# Patient Record
Sex: FEMALE | Employment: FULL TIME | URBAN - METROPOLITAN AREA
[De-identification: names, ages, dates, MRNs, and addresses within clinical notes are randomized per-mention and may not be internally consistent; named-entity substitution may affect disease eponyms.]

---

## 2018-12-14 LAB — CRC RECOMMENDATION EXT: NORMAL

## 2021-10-08 ENCOUNTER — TELEPHONE (OUTPATIENT)
Dept: ADMINISTRATIVE | Facility: HOSPITAL | Age: 56
End: 2021-10-08

## 2021-10-08 ENCOUNTER — OFFICE VISIT (OUTPATIENT)
Dept: PRIMARY CARE CLINIC | Facility: CLINIC | Age: 56
End: 2021-10-08
Payer: OTHER GOVERNMENT

## 2021-10-08 VITALS
WEIGHT: 156.5 LBS | DIASTOLIC BLOOD PRESSURE: 86 MMHG | OXYGEN SATURATION: 99 % | BODY MASS INDEX: 24.56 KG/M2 | HEIGHT: 67 IN | SYSTOLIC BLOOD PRESSURE: 126 MMHG | HEART RATE: 69 BPM

## 2021-10-08 DIAGNOSIS — Z11.59 SCREENING FOR VIRAL DISEASE: ICD-10-CM

## 2021-10-08 DIAGNOSIS — Z00.00 ANNUAL PHYSICAL EXAM: Primary | ICD-10-CM

## 2021-10-08 DIAGNOSIS — E04.1 THYROID NODULE: ICD-10-CM

## 2021-10-08 DIAGNOSIS — Z11.4 SCREENING FOR HIV (HUMAN IMMUNODEFICIENCY VIRUS): ICD-10-CM

## 2021-10-08 PROCEDURE — 99999 PR PBB SHADOW E&M-NEW PATIENT-LVL III: ICD-10-PCS | Mod: PBBFAC,,, | Performed by: INTERNAL MEDICINE

## 2021-10-08 PROCEDURE — 99396 PR PREVENTIVE VISIT,EST,40-64: ICD-10-PCS | Mod: S$GLB,,, | Performed by: INTERNAL MEDICINE

## 2021-10-08 PROCEDURE — 99999 PR PBB SHADOW E&M-NEW PATIENT-LVL III: CPT | Mod: PBBFAC,,, | Performed by: INTERNAL MEDICINE

## 2021-10-08 PROCEDURE — 99396 PREV VISIT EST AGE 40-64: CPT | Mod: S$GLB,,, | Performed by: INTERNAL MEDICINE

## 2021-10-13 ENCOUNTER — TELEPHONE (OUTPATIENT)
Dept: ADMINISTRATIVE | Facility: HOSPITAL | Age: 56
End: 2021-10-13

## 2021-10-19 LAB
ALBUMIN: 4.4 GRAM/DL (ref 3.5–5)
ALP SERPL-CCNC: 87 UNIT/L (ref 38–126)
ALT SERPL W P-5'-P-CCNC: 11 UNIT/L (ref 7–56)
ANION GAP SERPL CALC-SCNC: 18 MEQ/L (ref 9–18)
AST SERPL-CCNC: 16 UNIT/L (ref 7–40)
BASOPHILS ABSOLUTE COUNT: 0 K/UL (ref 0–0.2)
BASOPHILS NFR BLD: 0.5 % (ref 0–2)
BILIRUB SERPL-MCNC: 0.6 MG/DL (ref 0–1.2)
BUN BLD-MCNC: 12 MG/DL (ref 7–21)
BUN/CREAT SERPL: 11 RATIO (ref 6–22)
CALC OSMOLALITY: 277 MOSM/KG (ref 275–295)
CALCIUM SERPL-MCNC: 9.6 MG/DL (ref 8.5–10.4)
CHLORIDE SERPL-SCNC: 102 MEQ/L (ref 98–107)
CHOL/HDLC RATIO: 5
CHOLEST SERPL-MSCNC: 226 MG/DL (ref 100–200)
CO2 SERPL-SCNC: 23 MEQ/L (ref 21–31)
CREAT SERPL-MCNC: 1.1 MG/DL (ref 0.5–1)
DIFFERENTIAL TYPE: ABNORMAL
EOSINOPHIL NFR BLD: 1 % (ref 0–4)
EOSINOPHILS ABSOLUTE COUNT: 0.1 K/UL (ref 0–0.7)
ERYTHROCYTE [DISTWIDTH] IN BLOOD BY AUTOMATED COUNT: 12.5 % (ref 12–15.3)
FREE T4: 1.2 NANOGRAM/DL (ref 0.9–1.7)
GFR: 53.2 ML/MIN/1.73M2
GLUCOSE SERPL-MCNC: 96 MG/DL (ref 70–100)
HCT VFR BLD AUTO: 37.9 % (ref 37–47)
HDLC SERPL-MCNC: 50 MG/DL (ref 40–75)
HGB BLD-MCNC: 13.2 GRAM/DL (ref 12–16)
LDLC SERPL CALC-MCNC: 164 MG/DL (ref 0–125)
LYMPHOCYTES %: 28.9 % (ref 15–45)
LYMPHOCYTES ABSOLUTE COUNT: 1.9 K/UL (ref 1–4.2)
MCH RBC QN AUTO: 32.7 PICOGRAM (ref 27–33)
MCHC RBC AUTO-ENTMCNC: 34.9 GRAM/DL (ref 32–36)
MCV RBC AUTO: 93.7 FEMTOLITER (ref 81–99)
MONOCYTES %: 8.6 % (ref 3–13)
MONOCYTES ABSOLUTE COUNT: 0.6 K/UL (ref 0.1–0.8)
NEUTROPHILS ABSOLUTE COUNT: 4 K/UL (ref 2.1–7.6)
NEUTROPHILS RELATIVE PERCENT: 61 % (ref 32–80)
NONHDLC SERPL-MCNC: 176 MG/DL (ref 60–125)
PLATELET # BLD AUTO: 190 K/UL (ref 150–350)
PMV BLD AUTO: 9.7 FEMTOLITER (ref 7–10.2)
POTASSIUM SERPL-SCNC: 4.2 MEQ/L (ref 3.5–5)
RBC # BLD AUTO: 4.05 MIL/UL (ref 4.2–5.4)
SODIUM BLD-SCNC: 139 MEQ/L (ref 135–145)
TOTAL PROTEIN: 6.4 GRAM/DL (ref 6.3–8.2)
TRIGL SERPL-MCNC: 78 MG/DL (ref 30–150)
TSH SERPL DL<=0.005 MIU/L-ACNC: 2.16 UIL/ML (ref 0.35–4)
WBC # BLD AUTO: 6.6 K/UL (ref 4.5–11)

## 2022-01-05 DIAGNOSIS — Z12.31 OTHER SCREENING MAMMOGRAM: ICD-10-CM

## 2022-02-09 DIAGNOSIS — Z12.11 COLON CANCER SCREENING: ICD-10-CM

## 2022-04-25 ENCOUNTER — TELEPHONE (OUTPATIENT)
Dept: INTERNAL MEDICINE | Facility: CLINIC | Age: 57
End: 2022-04-25
Payer: OTHER GOVERNMENT

## 2022-04-25 NOTE — TELEPHONE ENCOUNTER
----- Message from Tina Iyer sent at 4/25/2022 10:27 AM CDT -----  Patient called back stating she took care and was able to reach her old ENT and will see him this week.    Thank you

## 2022-04-25 NOTE — TELEPHONE ENCOUNTER
----- Message from Jyothi Gordillo sent at 4/25/2022  9:24 AM CDT -----  Type:  Needs Medical Advice    Who Called: pt  Symptoms (please be specific): clogged ears  How long has patient had these symptoms:  1 week  Pharmacy name and phone #:    Would the patient rather a call back or a response via [facility]? call  Best Call Back Number: [phone]  Additional Information:

## 2022-04-25 NOTE — TELEPHONE ENCOUNTER
----- Message from Rosemary Acosta sent at 4/25/2022 10:05 AM CDT -----  Contact: [phone]  Pt returning missed call. Please Advise.

## 2022-09-22 ENCOUNTER — TELEPHONE (OUTPATIENT)
Dept: INTERNAL MEDICINE | Facility: CLINIC | Age: 57
End: 2022-09-22
Payer: OTHER GOVERNMENT

## 2022-09-22 DIAGNOSIS — Z00.00 ANNUAL PHYSICAL EXAM: Primary | ICD-10-CM

## 2022-09-22 NOTE — TELEPHONE ENCOUNTER
----- Message from Norma Adair sent at 9/22/2022  9:52 AM CDT -----  Contact: [phone]  type: Lab    Caller is requesting to schedule their Lab appointment prior to annual appointment.  Order is not listed in EPIC.  Please enter order and contact patient to schedule.    Name of Caller:Maritza Hathaway Date and Time of Labs 11/14/22    Date of Annual Physical Appointment:11/9/22    Where would they like the lab performed?[facility]     Would the patient rather a call back or a response via [facility]? call    Best Call Back Number:[phone]

## 2022-10-10 LAB
ALBUMIN: 4.3 G/DL (ref 3.5–5)
ALP SERPL-CCNC: 87 U/L (ref 38–126)
ALT SERPL W P-5'-P-CCNC: 9 U/L (ref 7–56)
ANION GAP SERPL CALC-SCNC: 17.5 MMOL/L (ref 9–18)
AST SERPL-CCNC: 15 U/L (ref 7–40)
BASOPHILS ABSOLUTE COUNT: 0 THOUSAND/UL (ref 0–0.2)
BASOPHILS NFR BLD: 0.6 % (ref 0–2)
BILIRUB SERPL-MCNC: 0.5 MG/DL (ref 0–1.2)
BUN BLD-MCNC: 14 MG/DL (ref 7–21)
BUN/CREAT SERPL: 13 (ref 6–22)
CALC OSMOLALITY: 284 MOSM/KG (ref 275–295)
CALCIUM SERPL-MCNC: 9.7 MG/DL (ref 8.5–10.4)
CHLORIDE SERPL-SCNC: 106 MMOL/L (ref 98–107)
CHOL/HDLC RATIO: 4.84
CHOLEST SERPL-MSCNC: 247 MG/DL (ref 100–200)
CO2 SERPL-SCNC: 23 MMOL/L (ref 21–31)
CREAT SERPL-MCNC: 1.04 MG/DL (ref 0.5–1)
EGFR: 68 ML/MIN
EOSINOPHIL NFR BLD: 1.2 % (ref 0–4)
EOSINOPHILS ABSOLUTE COUNT: 0.1 THOUSAND/UL (ref 0–0.7)
ERYTHROCYTE [DISTWIDTH] IN BLOOD BY AUTOMATED COUNT: 12.5 % (ref 12–15.3)
FREE T4: 1.06 NG/DL (ref 0.9–1.7)
GFR: 59 ML/MIN
GLUCOSE SERPL-MCNC: 97 MG/DL (ref 70–100)
HCT VFR BLD AUTO: 38.1 % (ref 37–47)
HDLC SERPL-MCNC: 51 MG/DL (ref 40–75)
HGB BLD-MCNC: 13.2 GM/DL (ref 12–16)
LDLC SERPL CALC-MCNC: 179 MG/DL (ref 0–125)
LYMPHOCYTES %: 34.3 % (ref 15–45)
LYMPHOCYTES ABSOLUTE COUNT: 2.2 THOUSAND/UL (ref 1–4.2)
MCH RBC QN AUTO: 32.4 PG (ref 27–33)
MCHC RBC AUTO-ENTMCNC: 34.6 G/DL (ref 32–36)
MCV RBC AUTO: 93.6 FL (ref 81–99)
MONOCYTES %: 8.8 % (ref 3–13)
MONOCYTES ABSOLUTE COUNT: 0.6 THOUSAND/UL (ref 0.1–0.8)
NEUTROPHILS ABSOLUTE COUNT: 3.4 THOUSAND/UL (ref 2.1–7.6)
NEUTROPHILS RELATIVE PERCENT: 55.1 % (ref 32–80)
PLATELET # BLD AUTO: 194 THOUSAND/UL (ref 150–350)
PMV BLD AUTO: 9.2 FL (ref 7–10.2)
POTASSIUM SERPL-SCNC: 4.5 MMOL/L (ref 3.5–5)
RBC # BLD AUTO: 4.07 MILLION/UL (ref 4.2–5.4)
SODIUM BLD-SCNC: 142 MMOL/L (ref 135–145)
TOTAL PROTEIN: 6 G/DL (ref 6.3–8.2)
TRIGL SERPL-MCNC: 106 MG/DL (ref 30–150)
TSH SERPL DL<=0.005 MIU/L-ACNC: 2.67 UIU/ML (ref 0.35–4)
WBC # BLD AUTO: 6.3 THOUSAND/UL (ref 4.5–11)

## 2022-11-09 ENCOUNTER — TELEPHONE (OUTPATIENT)
Dept: INTERNAL MEDICINE | Facility: CLINIC | Age: 57
End: 2022-11-09
Payer: OTHER GOVERNMENT

## 2022-11-09 ENCOUNTER — OFFICE VISIT (OUTPATIENT)
Dept: INTERNAL MEDICINE | Facility: CLINIC | Age: 57
End: 2022-11-09
Payer: OTHER GOVERNMENT

## 2022-11-09 VITALS
BODY MASS INDEX: 23.88 KG/M2 | SYSTOLIC BLOOD PRESSURE: 120 MMHG | OXYGEN SATURATION: 99 % | HEART RATE: 84 BPM | WEIGHT: 152.13 LBS | HEIGHT: 67 IN | DIASTOLIC BLOOD PRESSURE: 80 MMHG

## 2022-11-09 DIAGNOSIS — E04.1 THYROID NODULE: ICD-10-CM

## 2022-11-09 DIAGNOSIS — Z00.00 ANNUAL PHYSICAL EXAM: Primary | ICD-10-CM

## 2022-11-09 DIAGNOSIS — E78.2 HYPERLIPIDEMIA, MIXED: ICD-10-CM

## 2022-11-09 PROCEDURE — 99999 PR PBB SHADOW E&M-EST. PATIENT-LVL III: CPT | Mod: PBBFAC,,, | Performed by: INTERNAL MEDICINE

## 2022-11-09 PROCEDURE — 99999 PR PBB SHADOW E&M-EST. PATIENT-LVL III: ICD-10-PCS | Mod: PBBFAC,,, | Performed by: INTERNAL MEDICINE

## 2022-11-09 PROCEDURE — 99396 PR PREVENTIVE VISIT,EST,40-64: ICD-10-PCS | Mod: S$GLB,,, | Performed by: INTERNAL MEDICINE

## 2022-11-09 PROCEDURE — 99396 PREV VISIT EST AGE 40-64: CPT | Mod: S$GLB,,, | Performed by: INTERNAL MEDICINE

## 2022-11-09 NOTE — LETTER
AUTHORIZATION FOR RELEASE OF   CONFIDENTIAL INFORMATION    Dear Dr. Tadeo,    We are seeing Maritza Sesay, date of birth 1965, in the clinic at [facility]. Lizandro Rutledge MD is the patient's PCP. Maritza Sesay has an outstanding lab/procedure at the time we reviewed her chart. In order to help keep her health information updated, she has authorized us to request the following medical record(s):        (  )  MAMMOGRAM                                      (X)  COLONOSCOPY      (  )  PAP SMEAR                                          (  )  OUTSIDE LAB RESULTS     (  )  DEXA SCAN                                          (  )  EYE EXAM            (  )  FOOT EXAM                                          (  )  ENTIRE RECORD     (  )  OUTSIDE IMMUNIZATIONS                 (  )  _______________         Please fax records to [facility], David M Klibert, MD, [phone]     If you have any questions, please contact [facility] at [phone].           Patient Name: Maritza Sesay  : 1965  Patient Phone #: [phone]

## 2022-11-09 NOTE — PROGRESS NOTES
[facility] Note    Chief Complaint      Chief Complaint   Patient presents with    Annual Exam       History of Present Illness      Maritza Sesay is a 57 y.o. female with chronic conditions of thyroid nodule who presents today for: annual preventative visit.   Thyroid nodule: Sees Dr. Anna.  Surveillance imaging has been showing stability.  Diet: Prepares own food mostly.  Limiting fatty foods and carbs.  Trying to cut back on sweets.  Planning to cut back on sodas.  Drinks plenty water.  Exercise: mostly yardwork. Previously walking more.    Denies drinking and driving, drinking more than 4 drinks on occasion, drug use.     Flu shot declines.  Tdap unsure, due next visit.  COVID vaccine UTD.  Shingrix due.  Pneumonia vaccine due age 63.   Mammogram UTD. Pap smear UTD.  Sees Dr. Ardon. DEXA due age 63.  Colonoscopy Dr. Tadeo, UTD.     Past Medical History:  History reviewed. No pertinent past medical history.    Past Surgical History:   has a past surgical history that includes Cataract extraction, bilateral (Bilateral).    Family History:  family history includes Heart attack in her father; Hypertension in her mother; Pacemaker/defibrilator in her mother.     Social History:  Social History     Tobacco Use    Smoking status: Light Smoker    Smokeless tobacco: Never   Substance Use Topics    Alcohol use: Yes       I personally reviewed all past medical, surgical, social and family history.    Review of Systems   Constitutional:  Negative for chills, fever and malaise/fatigue.   Respiratory:  Negative for shortness of breath.    Cardiovascular:  Negative for chest pain.   Gastrointestinal:  Negative for constipation, diarrhea, nausea and vomiting.   Skin:  Negative for rash.   Neurological:  Negative for weakness.   All other systems reviewed and are negative.     Medications:  No outpatient encounter medications on file as of 11/9/2022.     No facility-administered encounter  "medications on file as of 11/9/2022.       Allergies:  Review of patient's allergies indicates:  No Known Allergies    Health Maintenance:  Immunization History   Administered Date(s) Administered    COVID-19, MRNA, LN-S, PF (Pfizer) (Purple Cap) 06/24/2021, 07/16/2021      Health Maintenance   Topic Date Due    Hepatitis C Screening  Never done    TETANUS VACCINE  Never done    Mammogram  01/17/2023    Lipid Panel  10/10/2027        Physical Exam      Vital Signs  Pulse: 84  SpO2: 99 %  BP: 120/80  BP Location: Left arm  Patient Position: Sitting  Pain Score: 0-No pain  Height and Weight  Height: 5' 7" (170.2 cm)  Weight: 69 kg (152 lb 1.9 oz)  BSA (Calculated - sq m): 1.81 sq meters  BMI (Calculated): 23.8  Weight in (lb) to have BMI = 25: 159.3]    Physical Exam  Vitals reviewed.   Constitutional:       Appearance: She is well-developed.   HENT:      Head: Normocephalic and atraumatic.      Right Ear: External ear normal.      Left Ear: External ear normal.   Cardiovascular:      Rate and Rhythm: Normal rate and regular rhythm.      Heart sounds: Normal heart sounds. No murmur heard.  Pulmonary:      Effort: Pulmonary effort is normal.      Breath sounds: Normal breath sounds. No wheezing or rales.   Abdominal:      General: Bowel sounds are normal. There is no distension.      Palpations: Abdomen is soft.      Tenderness: There is no abdominal tenderness.        Laboratory:  CBC:  Recent Labs   Lab 10/19/21  0824 10/10/22  0732   WBC 6.6 6.3   RBC 4.05 L 4.07 L   Hemoglobin 13.2 13.2   Hematocrit 37.9 38.1   Platelets 190 194   MCV 93.7 93.6   MCH 32.7 32.4   MCHC 34.9 34.6     CMP:  Recent Labs   Lab 10/19/21  0824 10/10/22  0732   Glucose 96 97   Calcium 9.6 9.7   ALBUMIN 4.4 4.3   Total Protein 6.4 6.0 L   Sodium 139 142   Potassium 4.2 4.5   CO2 23 23   Chloride 102 106   BUN 12 14.0   Alkaline Phosphatase 87 87   ALT 11 9   AST 16 15   Total Bilirubin 0.6 0.5     URINALYSIS:       LIPIDS:  Recent Labs   Lab " 10/19/21  0824 10/10/22  0732   TSH 2.16 2.67   HDL 50 51   Cholesterol 226 H 247 H   Triglycerides 78 106   LDL Calculated 164 H 179 H   Non-HDL Cholesterol 176 H  --      TSH:  Recent Labs   Lab 10/19/21  0824 10/10/22  0732   TSH 2.16 2.67     A1C:        Assessment/Plan     Maritza Sesay is a 57 y.o.female with:    1. Annual physical exam  Discussed diet and exercise, vaccines and cancer screening, risk factors.  Screening labs reviewed.  2. Hyperlipidemia, mixed  - Comprehensive Metabolic Panel; Future  - Lipid Panel; Future  - Comprehensive Metabolic Panel  - Lipid Panel  UPdate labs.  Discussed diet.  Discussed CT calcium score as a way to further risk stratify  3. Thyroid nodule   F/U with endocrinology.    Chronic conditions status updated as per HPI.  Other than changes above, cont current medications and maintain follow up with specialists.  Follow up in about 1 year (around 11/9/2023) for Annual preventative visit.    No future appointments.    Lizandro Rutledge MD  [facility]

## 2022-11-09 NOTE — LETTER
AUTHORIZATION FOR RELEASE OF   CONFIDENTIAL INFORMATION    Dear Dr. Ardon,    We are seeing Maritza Sesay, date of birth 1965, in the clinic at [facility]. Lizandro Rutledge MD is the patient's PCP. Maritza Sesay has an outstanding lab/procedure at the time we reviewed her chart. In order to help keep her health information updated, she has authorized us to request the following medical record(s):        (  )  MAMMOGRAM                                      (  )  COLONOSCOPY      (X)  PAP SMEAR                                          (  )  OUTSIDE LAB RESULTS     (  )  DEXA SCAN                                          (  )  EYE EXAM            (  )  FOOT EXAM                                          (  )  ENTIRE RECORD     (  )  OUTSIDE IMMUNIZATIONS                 (  )  _______________         Please fax records to [facility], David M Klibert, MD, [phone]     If you have any questions, please contact [facility] at [phone].           Patient Name: Maritza Sesay  : 1965  Patient Phone #: [phone]

## 2022-11-14 ENCOUNTER — PATIENT OUTREACH (OUTPATIENT)
Dept: ADMINISTRATIVE | Facility: HOSPITAL | Age: 57
End: 2022-11-14
Payer: OTHER GOVERNMENT

## 2022-11-14 NOTE — PROGRESS NOTES
Health Maintenance Due   Topic Date Due    Hepatitis C Screening  Never done    Cervical Cancer Screening  Never done    Pneumococcal Vaccines (Age 0-64) (1 - PCV) Never done    HIV Screening  Never done    TETANUS VACCINE  Never done    Shingles Vaccine (1 of 2) Never done    COVID-19 Vaccine (3 - Booster for Pfizer series) 09/10/2021    Influenza Vaccine (1) Never done     Chart review done. HM updated. Immunizations reviewed & updated. Care Everywhere updated.  Colonoscopy from 12/14/2018 scanned into chart.

## 2023-09-19 ENCOUNTER — TELEPHONE (OUTPATIENT)
Dept: PRIMARY CARE CLINIC | Facility: CLINIC | Age: 58
End: 2023-09-19
Payer: OTHER GOVERNMENT

## 2023-09-19 DIAGNOSIS — E04.1 THYROID NODULE: Primary | ICD-10-CM

## 2023-09-19 DIAGNOSIS — Z00.00 ANNUAL PHYSICAL EXAM: ICD-10-CM

## 2023-09-19 NOTE — TELEPHONE ENCOUNTER
----- Message from Zeferino Lee sent at 9/19/2023  8:59 AM CDT -----  Contact: Pt [phone]  type: Lab    Caller is requesting to schedule their Lab appointment prior to annual appointment.  Order is not listed in EPIC.  Please enter order and contact patient to schedule.    Thank you

## 2023-10-30 LAB
2ND TRIMESTER 4 SCREEN SERPL-IMP: NORMAL
ABSOLUTE PLASMA CELLS: NORMAL
ACANTHOCYTES: NORMAL
ALBUMIN: 4 G/DL (ref 3.4–5)
ALP SERPL-CCNC: 78 U/L (ref 20–120)
ALT SERPL W P-5'-P-CCNC: 11 U/L
AST SERPL-CCNC: 14 U/L
ATYPICAL LYMPHOCYTE RELATIVE PERCENT: NORMAL
AUER BODIES BLD QL SMEAR: NORMAL
BANDS ABSOLUTE: NORMAL
BASO STIP: NORMAL
BASOPHILS ABSOLUTE COUNT: 0 THOUSAND/UL (ref 0–0.2)
BASOPHILS ABSOLUTE COUNT: NORMAL
BASOPHILS NFR BLD AUTO: NORMAL %
BASOPHILS NFR BLD: 0.2 %
BILIRUB SERPL-MCNC: 0.6 MG/DL
BLAST CELLS ABSOLUTE COUNT: NORMAL
BLASTS %: NORMAL
BUN BLD-MCNC: 11 MG/DL (ref 7–25)
BURR CELLS BLD QL SMEAR: NORMAL
CALCIUM SERPL-MCNC: 8.9 MG/DL (ref 8.4–10.3)
CHLORIDE SERPL-SCNC: 105 MMOL/L (ref 96–110)
CHOL/HDLC RATIO: 5.11 (ref 0–4.4)
CHOLEST SERPL-MSCNC: 276 MG/DL
CO2 SERPL-SCNC: 27 MMOL/L (ref 24–32)
CREAT SERPL-MCNC: 1.01 MG/DL (ref 0.5–1.1)
CRENATED RBC'S: NORMAL
DIFFERENTIAL COMMENT: NORMAL
DOHLE BOD BLD QL SMEAR: NORMAL
EGFR: 64 ML/MIN/1.73M2
EOSINOPHIL NFR BLD AUTO: NORMAL %
EOSINOPHIL NFR BLD: 1.4 %
EOSINOPHILS ABSOLUTE COUNT: 0.1 THOUSAND/UL (ref 0–0.6)
EOSINOPHILS ABSOLUTE COUNT: NORMAL
ERYTHROCYTE [DISTWIDTH] IN BLOOD BY AUTOMATED COUNT: 12.8 % (ref 11.5–14.5)
FREE T4: 0.7 NG/DL (ref 0.6–1.15)
GIANT PLATELETS BLD QL SMEAR: NORMAL
GLUCOSE SERPL-MCNC: 94 MG/DL (ref 65–99)
HCT VFR BLD AUTO: 39 % (ref 35–46)
HDLC SERPL-MCNC: 54 MG/DL (ref 40–59)
HGB BLD-MCNC: 13 GM/DL (ref 12–16)
HOWELL-JOLLY BOD BLD QL SMEAR: NORMAL
HYPERSEG PMN: NORMAL
HYPO: NORMAL
IMM MONOCLEAR CELLS RELATIVE: NORMAL
IMM MONONUCLEAR CELLS ABSOLUTE: NORMAL
LDLC SERPL CALC-MCNC: 204 MG/DL
LYMPHOCYTES %: 38 %
LYMPHOCYTES ABSOLUTE COUNT: 2.4 THOUSAND/UL (ref 1.1–5)
LYMPHOCYTES ABSOLUTE COUNT: NORMAL
LYMPHOCYTES NFR BLD AUTO: NORMAL %
LYMPHOCYTES RELATIVE PERCENT: NORMAL
MACROCYTOSIS: NORMAL
MCH RBC QN AUTO: 31.6 PG (ref 26–34)
MCHC RBC AUTO-ENTMCNC: 33.4 G/DL
MCV RBC AUTO: 94.8 FL (ref 80–100)
METAMYELOCYTE, ABS: NORMAL
METAMYELOCYTES PERCENT: NORMAL
MICROCYTOSIS: NORMAL
MONOCYTES %: 9.5 %
MONOCYTES ABSOLUTE COUNT: 0.6 THOUSAND/UL (ref 0.2–1.1)
MONOCYTES ABSOLUTE COUNT: NORMAL
MONOCYTES RELATIVE: NORMAL
MYELOCYTE ABSOLUTE COUNT: NORMAL
NEUTROPHILS ABSOLUTE COUNT: 3.3 THOUSAND/UL (ref 1.8–8)
NEUTROPHILS ABSOLUTE COUNT: NORMAL
NEUTROPHILS RELATIVE PERCENT: 50.9 %
NEUTROPHILS RELATIVE PERCENT: NORMAL
NON HDL CHOL (CALC): 222
NUCLEATED RBCS: NORMAL
OVALOCYTES BLD QL SMEAR: NORMAL
PAPPENHEIMER BOD BLD QL SMEAR: NORMAL
PATHOLOGIST REVIEW: NORMAL
PLASMA CELLS: NORMAL
PLATELET # BLD AUTO: 170 THOUSAND/UL (ref 130–400)
PLATELET CLUMPS: NORMAL
PMV BLD AUTO: 8.7 FL (ref 7.4–10.4)
POIKILOCYTOSIS BLD QL SMEAR: NORMAL
POLY: NORMAL
POTASSIUM SERPL-SCNC: 4.2 MMOL/L (ref 3.6–5.2)
PROMYELOCYTES ABSOLUTE COUNT: NORMAL
PROMYELOCYTES RELATIVE PERCENT: NORMAL
RBC # BLD AUTO: 4.12 MILLION/UL (ref 4–5.2)
RED CELL MORPHOLOGY CMT: NORMAL
ROULEAUX BLD QL SMEAR: NORMAL
SCHISTOCYTES: NORMAL
SICKLE CELLS: NORMAL
SMALL PLATELETS BLD QL SMEAR: NORMAL
SMUDGE CELLS BLD QL SMEAR: NORMAL
SODIUM BLD-SCNC: 139 MMOL/L (ref 135–146)
SPHEROCYTES BLD QL SMEAR: NORMAL
STOMATOCYTES BLD QL SMEAR: NORMAL
TARGETS BLD QL SMEAR: NORMAL
TEAR DROP CELLS: NORMAL
TETRACYCLINE TITR SBT: NORMAL {TITER}
TOBRAMYCIN ISLT MIC: NORMAL
TOTAL PROTEIN: 6.5 G/DL (ref 6–8)
TOXIC GRANULES BLD QL SMEAR: NORMAL
TRIGL SERPL-MCNC: 90 MG/DL
TSH SERPL DL<=0.005 MIU/L-ACNC: 2.32 UIU/ML (ref 0.5–5)
VACUOLATED PMNS: NORMAL
WBC # BLD AUTO: 6.4 THOUSAND/UL (ref 4.5–11)
WBC MORPHOLOGY: NORMAL

## 2023-11-07 ENCOUNTER — TELEPHONE (OUTPATIENT)
Dept: PRIMARY CARE CLINIC | Facility: CLINIC | Age: 58
End: 2023-11-07
Payer: OTHER GOVERNMENT

## 2023-11-07 NOTE — PROGRESS NOTES
Labs look good except cholesterol significantly elevated.  Will discuss treatment options at upcoming appointment.

## 2023-11-07 NOTE — TELEPHONE ENCOUNTER
----- Message from Liznadro Rutledge MD sent at 11/6/2023  9:46 PM CST -----  Labs look good except cholesterol significantly elevated.  Will discuss treatment options at upcoming appointment.

## 2023-11-22 ENCOUNTER — OFFICE VISIT (OUTPATIENT)
Dept: PRIMARY CARE CLINIC | Facility: CLINIC | Age: 58
End: 2023-11-22
Payer: OTHER GOVERNMENT

## 2023-11-22 ENCOUNTER — PATIENT MESSAGE (OUTPATIENT)
Dept: PRIMARY CARE CLINIC | Facility: CLINIC | Age: 58
End: 2023-11-22

## 2023-11-22 VITALS
BODY MASS INDEX: 22.84 KG/M2 | OXYGEN SATURATION: 98 % | HEIGHT: 67 IN | SYSTOLIC BLOOD PRESSURE: 120 MMHG | DIASTOLIC BLOOD PRESSURE: 100 MMHG | WEIGHT: 145.5 LBS | HEART RATE: 94 BPM

## 2023-11-22 DIAGNOSIS — E78.2 HYPERLIPIDEMIA, MIXED: ICD-10-CM

## 2023-11-22 DIAGNOSIS — E04.1 THYROID NODULE: ICD-10-CM

## 2023-11-22 DIAGNOSIS — Z00.00 ANNUAL PHYSICAL EXAM: Primary | ICD-10-CM

## 2023-11-22 DIAGNOSIS — Z13.6 ENCOUNTER FOR SCREENING FOR CARDIOVASCULAR DISORDERS: ICD-10-CM

## 2023-11-22 PROCEDURE — 99396 PREV VISIT EST AGE 40-64: CPT | Mod: S$GLB,,, | Performed by: INTERNAL MEDICINE

## 2023-11-22 PROCEDURE — 99999 PR PBB SHADOW E&M-EST. PATIENT-LVL III: ICD-10-PCS | Mod: PBBFAC,,, | Performed by: INTERNAL MEDICINE

## 2023-11-22 PROCEDURE — 99396 PR PREVENTIVE VISIT,EST,40-64: ICD-10-PCS | Mod: S$GLB,,, | Performed by: INTERNAL MEDICINE

## 2023-11-22 PROCEDURE — 99999 PR PBB SHADOW E&M-EST. PATIENT-LVL III: CPT | Mod: PBBFAC,,, | Performed by: INTERNAL MEDICINE

## 2023-11-22 RX ORDER — ROSUVASTATIN CALCIUM 5 MG/1
5 TABLET, COATED ORAL DAILY
Qty: 90 TABLET | Refills: 3 | Status: SHIPPED | OUTPATIENT
Start: 2023-11-22 | End: 2024-11-21

## 2023-11-22 NOTE — PROGRESS NOTES
[facility] Note    Chief Complaint      Chief Complaint   Patient presents with    Annual Exam       History of Present Illness      Maritza Sesay is a 58 y.o. female with chronic conditions of thyroid nodule who presents today for: annual preventative visit.   Thyroid nodule: Previously saw Dr. Anna.  Surveillance imaging has been showing stability. Repeat 2025.  Thyroid labs normal.  HLD: .   Diet: Prepares own food mostly.  Limiting fatty foods and carbs. Has been drinking more soda.  Drinks plenty water.  Exercise: mostly yardwork. Previously walking more.    Denies drinking and driving, drinking more than 4 drinks on occasion, drug use.     Flu shot declines.  Tdap unsure, due.  COVID vaccine UTD.  Shingrix due.  Pneumonia vaccine due age 63.   Mammogram UTD. Pap smear UTD.  Sees Dr. Ardon. DEXA due age 63.  Colonoscopy Dr. Tadeo, 2018, polyp, 5 yr interval.      Past Medical History:  History reviewed. No pertinent past medical history.    Past Surgical History:   has a past surgical history that includes Cataract extraction, bilateral (Bilateral).    Family History:  family history includes Heart attack in her father; Hypertension in her mother; Pacemaker/defibrilator in her mother.     Social History:  Social History     Tobacco Use    Smoking status: Light Smoker    Smokeless tobacco: Never   Substance Use Topics    Alcohol use: Yes       I personally reviewed all past medical, surgical, social and family history.    Review of Systems   Constitutional:  Negative for chills, fever and malaise/fatigue.   Respiratory:  Negative for shortness of breath.    Cardiovascular:  Negative for chest pain.   Gastrointestinal:  Negative for constipation, diarrhea, nausea and vomiting.   Skin:  Negative for rash.   Neurological:  Negative for weakness.   All other systems reviewed and are negative.       Medications:  Outpatient Encounter Medications as of 11/22/2023   Medication  "Sig Dispense Refill    rosuvastatin (CRESTOR) 5 MG tablet Take 1 tablet (5 mg total) by mouth once daily. 90 tablet 3     No facility-administered encounter medications on file as of 11/22/2023.       Allergies:  Review of patient's allergies indicates:  No Known Allergies    Health Maintenance:  Immunization History   Administered Date(s) Administered    COVID-19, MRNA, LN-S, PF (Pfizer) (Purple Cap) 06/24/2021, 07/16/2021      Health Maintenance   Topic Date Due    Hepatitis C Screening  Never done    TETANUS VACCINE  Never done    Shingles Vaccine (1 of 2) Never done    Colorectal Cancer Screening  12/14/2023    Mammogram  03/13/2024    Lipid Panel  10/30/2028        Physical Exam      Vital Signs  Pulse: 94  SpO2: 98 %  BP: (!) 120/100  BP Location: Right arm  Patient Position: Sitting  Pain Score: 0-No pain  Height and Weight  Height: 5' 7" (170.2 cm)  Weight: 66 kg (145 lb 8.1 oz)  BSA (Calculated - sq m): 1.77 sq meters  BMI (Calculated): 22.8  Weight in (lb) to have BMI = 25: 159.3]    Physical Exam  Vitals reviewed.   Constitutional:       Appearance: She is well-developed.   HENT:      Head: Normocephalic and atraumatic.      Right Ear: External ear normal.      Left Ear: External ear normal.   Cardiovascular:      Rate and Rhythm: Normal rate and regular rhythm.      Heart sounds: Normal heart sounds. No murmur heard.  Pulmonary:      Effort: Pulmonary effort is normal.      Breath sounds: Normal breath sounds. No wheezing or rales.   Abdominal:      General: Bowel sounds are normal. There is no distension.      Palpations: Abdomen is soft.      Tenderness: There is no abdominal tenderness.          Laboratory:  CBC:  Recent Labs   Lab 10/19/21  0824 10/10/22  0732 10/30/23  0907   WBC 6.6 6.3 6.4   RBC 4.05 L 4.07 L 4.12   Hemoglobin 13.2 13.2 13.0   Hematocrit 37.9 38.1 39.0   Platelets 190 194 170   MCV 93.7 93.6 94.8   MCH 32.7 32.4 31.6   MCHC 34.9 34.6 33.4     CMP:  Recent Labs   Lab 10/19/21  0824 " 10/10/22  0732 10/30/23  0907   Glucose 96 97 94   Calcium 9.6 9.7 8.9   ALBUMIN 4.4 4.3 4.0   Total Protein 6.4 6.0 L 6.5   Sodium 139 142 139   Potassium 4.2 4.5 4.2   CO2 23 23 27   Chloride 102 106 105   BUN 12 14.0 11.0   Alkaline Phosphatase 87 87 78   ALT 11 9 11   AST 16 15 14   Total Bilirubin 0.6 0.5 0.6     URINALYSIS:       LIPIDS:  Recent Labs   Lab 10/19/21  0824 10/10/22  0732 10/30/23  0907   TSH 2.16 2.67 2.32   HDL 50 51 54   Cholesterol 226 H 247 H 276 H   Triglycerides 78 106 90   LDL Calculated 164 H 179 H 204 H   Non-HDL Cholesterol 176 H  --   --      TSH:  Recent Labs   Lab 10/19/21  0824 10/10/22  0732 10/30/23  0907   TSH 2.16 2.67 2.32     A1C:        Assessment/Plan     Maritza Sesay is a 58 y.o.female with:    1. Annual physical exam  Discussed diet and exercise, vaccines and cancer screening, risk factors.  Screening labs reviewed.  2. Thyroid nodule  Labs reviewed.  3. Hyperlipidemia, mixed  - rosuvastatin (CRESTOR) 5 MG tablet; Take 1 tablet (5 mg total) by mouth once daily.  Dispense: 90 tablet; Refill: 3  - Comprehensive Metabolic Panel; Future  - Lipid Panel; Future  Start crestor.  Recheck in 6-8 weeks.  4. Encounter for screening for cardiovascular disorders  - CT Calcium Scoring Cardiac; Future       Chronic conditions status updated as per HPI.  Other than changes above, cont current medications and maintain follow up with specialists.  Follow up in about 1 year (around 11/22/2024) for Annual preventative visit.    No future appointments.      Lizandro Rutledge MD  [facility]

## 2024-02-22 ENCOUNTER — LAB VISIT (OUTPATIENT)
Dept: LAB | Facility: HOSPITAL | Age: 59
End: 2024-02-22
Attending: INTERNAL MEDICINE
Payer: OTHER GOVERNMENT

## 2024-02-22 DIAGNOSIS — E78.2 HYPERLIPIDEMIA, MIXED: ICD-10-CM

## 2024-02-22 LAB
ALBUMIN SERPL BCP-MCNC: 3.8 G/DL (ref 3.5–5.2)
ALP SERPL-CCNC: 93 U/L (ref 55–135)
ALT SERPL W/O P-5'-P-CCNC: 13 U/L (ref 10–44)
ANION GAP SERPL CALC-SCNC: 5 MMOL/L (ref 8–16)
AST SERPL-CCNC: 16 U/L (ref 10–40)
BILIRUB SERPL-MCNC: 1 MG/DL (ref 0.1–1)
BUN SERPL-MCNC: 11 MG/DL (ref 6–20)
CALCIUM SERPL-MCNC: 9.7 MG/DL (ref 8.7–10.5)
CHLORIDE SERPL-SCNC: 109 MMOL/L (ref 95–110)
CHOLEST SERPL-MCNC: 134 MG/DL (ref 120–199)
CHOLEST/HDLC SERPL: 2.9 {RATIO} (ref 2–5)
CO2 SERPL-SCNC: 26 MMOL/L (ref 23–29)
CREAT SERPL-MCNC: 1 MG/DL (ref 0.5–1.4)
EST. GFR  (NO RACE VARIABLE): >60 ML/MIN/1.73 M^2
GLUCOSE SERPL-MCNC: 87 MG/DL (ref 70–110)
HDLC SERPL-MCNC: 47 MG/DL (ref 40–75)
HDLC SERPL: 35.1 % (ref 20–50)
LDLC SERPL CALC-MCNC: 73.6 MG/DL (ref 63–159)
NONHDLC SERPL-MCNC: 87 MG/DL
POTASSIUM SERPL-SCNC: 4.3 MMOL/L (ref 3.5–5.1)
PROT SERPL-MCNC: 6.6 G/DL (ref 6–8.4)
SODIUM SERPL-SCNC: 140 MMOL/L (ref 136–145)
TRIGL SERPL-MCNC: 67 MG/DL (ref 30–150)

## 2024-02-22 PROCEDURE — 80061 LIPID PANEL: CPT | Performed by: INTERNAL MEDICINE

## 2024-02-22 PROCEDURE — 80053 COMPREHEN METABOLIC PANEL: CPT | Performed by: INTERNAL MEDICINE

## 2024-02-22 PROCEDURE — 36415 COLL VENOUS BLD VENIPUNCTURE: CPT | Performed by: INTERNAL MEDICINE

## 2024-03-14 ENCOUNTER — TELEPHONE (OUTPATIENT)
Dept: PRIMARY CARE CLINIC | Facility: CLINIC | Age: 59
End: 2024-03-14
Payer: OTHER GOVERNMENT

## 2024-03-14 NOTE — TELEPHONE ENCOUNTER
----- Message from Kelly Charles sent at 3/14/2024  8:21 AM CDT -----  Contact: [phone]  2TESTRESULTS    Type: Test Results    What test was performed? Fasting labs     Who ordered the test? Dr Rutledge     When and where were the test performed? 02/22 at the [facility] location     Would you like response via Mychart: no    Comments: please call

## 2024-04-03 DIAGNOSIS — Z12.31 OTHER SCREENING MAMMOGRAM: ICD-10-CM

## 2024-04-10 ENCOUNTER — PATIENT MESSAGE (OUTPATIENT)
Dept: ADMINISTRATIVE | Facility: HOSPITAL | Age: 59
End: 2024-04-10
Payer: OTHER GOVERNMENT

## 2024-09-13 ENCOUNTER — TELEPHONE (OUTPATIENT)
Dept: PRIMARY CARE CLINIC | Facility: CLINIC | Age: 59
End: 2024-09-13
Payer: OTHER GOVERNMENT

## 2024-09-13 NOTE — TELEPHONE ENCOUNTER
----- Message from Joyce Connie Bond sent at 9/13/2024  9:49 AM CDT -----  Contact: [phone]  1MEDICALADVICE     Patient is calling for Medical Advice regarding:pt is calling she has a sinus issues and left ear stop up.  Can you please prescribe something for her and call her back and advise    How long has patient had these symptoms:    Pharmacy name and phone#:  [id number] - [place] - [address]  [address]  [place] [address]  Phone: [phone] Fax: [phone]       Patient wants a call back or thru [facility]:[place]    Comments:    Please advise patient replies from provider may take up to 48 hours.

## 2024-09-19 ENCOUNTER — OFFICE VISIT (OUTPATIENT)
Dept: PRIMARY CARE CLINIC | Facility: CLINIC | Age: 59
End: 2024-09-19
Payer: OTHER GOVERNMENT

## 2024-09-19 VITALS
HEART RATE: 92 BPM | DIASTOLIC BLOOD PRESSURE: 84 MMHG | OXYGEN SATURATION: 99 % | SYSTOLIC BLOOD PRESSURE: 124 MMHG | HEIGHT: 67 IN | BODY MASS INDEX: 23.43 KG/M2 | WEIGHT: 149.25 LBS

## 2024-09-19 DIAGNOSIS — H66.92 LEFT OTITIS MEDIA, UNSPECIFIED OTITIS MEDIA TYPE: ICD-10-CM

## 2024-09-19 DIAGNOSIS — H92.02 LEFT EAR PAIN: Primary | ICD-10-CM

## 2024-09-19 PROCEDURE — 99999 PR PBB SHADOW E&M-EST. PATIENT-LVL III: CPT | Mod: PBBFAC,,, | Performed by: NURSE PRACTITIONER

## 2024-09-19 PROCEDURE — 99214 OFFICE O/P EST MOD 30 MIN: CPT | Mod: S$GLB,,, | Performed by: NURSE PRACTITIONER

## 2024-09-19 RX ORDER — METHYLPREDNISOLONE 4 MG/1
TABLET ORAL
Qty: 21 EACH | Refills: 0 | Status: SHIPPED | OUTPATIENT
Start: 2024-09-19 | End: 2024-10-10

## 2024-09-19 RX ORDER — AMOXICILLIN AND CLAVULANATE POTASSIUM 875; 125 MG/1; MG/1
1 TABLET, FILM COATED ORAL EVERY 12 HOURS
Qty: 14 TABLET | Refills: 0 | Status: SHIPPED | OUTPATIENT
Start: 2024-09-19 | End: 2024-09-26

## 2024-09-19 NOTE — PROGRESS NOTES
[facility] Note    Chief Complaint      Chief Complaint   Patient presents with    Sinus Problem     C/o clogged ears, thinks it is sinus related.        History of Present Illness      History of Present Illness    CHIEF COMPLAINT:  Maritza presents today for ear congestion and sinus pressure.    EAR AND SINUS SYMPTOMS:  She reports sinus and ear issues starting 1.5-2 months ago. Initially, both ears were clogged. Symptoms cleared during a trip to [place] but recurred upon return, with left ear congestion more pronounced. She experiences unilateral sinus pressure. She denies pain, fever, chills, sore throat, cough, postnasal drip, decreased hearing, or tinnitus. Symptoms persisted despite using Flonase as previously recommended by her ENT. She has been using earplugs for air travel for about 20 years to manage benign positional vertigo and prevent ear problems during flights.    MEDICAL HISTORY:  She has a history of benign positional vertigo.    MEDICATIONS:  She is currently taking Crestor.    ALLERGIES:  She denies any known allergies.    SOCIAL HISTORY:  She recently traveled to [place] for pleasure.      ROS:  General: -fever, -chills  ENT: +nasal congestion, -sore throat, -post nasal drip, -hearing loss  Respiratory: -cough         Past Medical History:  History reviewed. No pertinent past medical history.    Past Surgical History:   has a past surgical history that includes Cataract extraction, bilateral (Bilateral).    Family History:  family history includes Heart attack in her father; Hypertension in her mother; Pacemaker/defibrilator in her mother.     Social History:  Social History     Tobacco Use    Smoking status: Light Smoker    Smokeless tobacco: Never   Substance Use Topics    Alcohol use: Yes         Medications:  Outpatient Encounter Medications as of 9/19/2024   Medication Sig Dispense Refill    rosuvastatin (CRESTOR) 5 MG tablet Take 1 tablet (5 mg total) by mouth once  "daily. 90 tablet 3    amoxicillin-clavulanate 875-125mg (AUGMENTIN) 875-125 mg per tablet Take 1 tablet by mouth every 12 (twelve) hours. for 7 days 14 tablet 0    methylPREDNISolone (MEDROL DOSEPACK) 4 mg tablet use as directed 21 each 0     No facility-administered encounter medications on file as of 9/19/2024.       Allergies:  Review of patient's allergies indicates:  No Known Allergies    Health Maintenance:  Immunization History   Administered Date(s) Administered    COVID-19, MRNA, LN-S, PF (Pfizer) (Purple Cap) 06/24/2021, 07/16/2021      Health Maintenance   Topic Date Due    Hepatitis C Screening  Never done    TETANUS VACCINE  Never done    Shingles Vaccine (1 of 2) Never done    Colorectal Cancer Screening  12/14/2023    Mammogram  04/05/2025    Lipid Panel  02/22/2029        Physical Exam      Vital Signs  Pulse: 92  SpO2: 99 %  BP: 124/84  Pain Score: 0-No pain  Height and Weight  Height: 5' 7" (170.2 cm)  Weight: 67.7 kg (149 lb 4 oz)  BSA (Calculated - sq m): 1.79 sq meters  BMI (Calculated): 23.4  Weight in (lb) to have BMI = 25: 159.3]    Physical Exam  Constitutional:       Appearance: She is well-developed.   HENT:      Head: Normocephalic and atraumatic.      Right Ear: Tympanic membrane normal.      Left Ear: Tympanic membrane is erythematous.   Cardiovascular:      Rate and Rhythm: Normal rate and regular rhythm.      Heart sounds: Normal heart sounds. No murmur heard.  Pulmonary:      Effort: Pulmonary effort is normal. No respiratory distress.      Breath sounds: Normal breath sounds.   Skin:     General: Skin is warm and dry.   Neurological:      Mental Status: She is alert. Mental status is at baseline.   Psychiatric:         Behavior: Behavior normal.          Laboratory:  CBC:  Recent Labs   Lab 10/19/21  0824 10/10/22  0732 10/30/23  0907   WBC 6.6 6.3 6.4   RBC 4.05 L 4.07 L 4.12   Hemoglobin 13.2 13.2 13.0   Hematocrit 37.9 38.1 39.0   Platelets 190 194 170   MCV 93.7 93.6 94.8   MCH " 32.7 32.4 31.6   MCHC 34.9 34.6 33.4     CMP:  Recent Labs   Lab 10/10/22  0732 10/30/23  0907 02/22/24  0719   Glucose 97 94 87   Calcium 9.7 8.9 9.7   Albumin  --   --  3.8   ALBUMIN 4.3 4.0  --    Total Protein 6.0 L 6.5 6.6   Sodium 142 139 140   Potassium 4.5 4.2 4.3   CO2 23 27 26   Chloride 106 105 109   BUN 14.0 11.0 11   Alkaline Phosphatase 87 78 93   ALT 9 11 13   AST 15 14 16   Total Bilirubin 0.5 0.6 1.0     URINALYSIS:       LIPIDS:  Recent Labs   Lab 10/19/21  0824 10/10/22  0732 10/30/23  0907 02/22/24  0719   TSH 2.16 2.67 2.32  --    HDL 50 51 54 47   Cholesterol 226 H 247 H 276 H 134   Triglycerides 78 106 90 67   LDL Cholesterol  --   --   --  73.6   LDL Calculated 164 H 179 H 204 H  --    HDL/Cholesterol Ratio  --   --   --  35.1   Non-HDL Cholesterol 176 H  --   --  87   Total Cholesterol/HDL Ratio  --   --   --  2.9     TSH:  Recent Labs   Lab 10/19/21  0824 10/10/22  0732 10/30/23  0907   TSH 2.16 2.67 2.32     A1C:        Assessment/Plan     Maritza Sesay is a 59 y.o.female with:    Assessment & Plan    Assessed ear and sinus symptoms, noting retracted and red tympanic membrane, particularly on the left side  Determined need for both steroid and antibiotic treatment to address inflammation and potential infection  Considered ENT referral if symptoms do not improve with initial treatment    EAR INFECTION:  - Explained the presence of inflammation in the ear, particularly on the left side.  - Discussed the rationale for using both steroids and antibiotics to address the prolonged symptoms.  - Started Augmentin: 1 tablet in the morning and 1 tablet at night for 7 days.  - Started Medrol Dosepak (steroid).  - Maritza to use ocean spray to help equalize ear pressure.    NASAL CONGESTION:  - Maritza to continue using Flonase nasal spray.  - Continued Flonase nasal spray.    ENT REFERRAL:  - Referred to ENT for evaluation.    FOLLOW UP:  - Contact the office if symptoms do not  improve after a few days of treatment.  - Follow up when ENT office calls to schedule an appointment.         1. Left ear pain  - Ambulatory referral/consult to ENT; Future  - amoxicillin-clavulanate 875-125mg (AUGMENTIN) 875-125 mg per tablet; Take 1 tablet by mouth every 12 (twelve) hours. for 7 days  Dispense: 14 tablet; Refill: 0  - methylPREDNISolone (MEDROL DOSEPACK) 4 mg tablet; use as directed  Dispense: 21 each; Refill: 0    2. Left otitis media, unspecified otitis media type  - amoxicillin-clavulanate 875-125mg (AUGMENTIN) 875-125 mg per tablet; Take 1 tablet by mouth every 12 (twelve) hours. for 7 days  Dispense: 14 tablet; Refill: 0  - methylPREDNISolone (MEDROL DOSEPACK) 4 mg tablet; use as directed  Dispense: 21 each; Refill: 0       Chronic conditions status updated as per HPI.  Other than changes above, cont current medications and maintain follow up with specialists.  No follow-ups on file.    No future appointments.    This note was generated with the assistance of ambient listening technology. Verbal consent was obtained by the patient and accompanying visitor(s) for the recording of patient appointment to facilitate this note. I attest to having reviewed and edited the generated note for accuracy, though some syntax or spelling errors may persist. Please contact the author of this note for any clarification.      Jessica Wong, [facility]

## 2024-11-15 DIAGNOSIS — E78.2 HYPERLIPIDEMIA, MIXED: ICD-10-CM

## 2024-11-15 RX ORDER — ROSUVASTATIN CALCIUM 5 MG/1
5 TABLET, COATED ORAL DAILY
Qty: 90 TABLET | Refills: 3 | Status: SHIPPED | OUTPATIENT
Start: 2024-11-15 | End: 2025-11-15

## 2024-11-15 NOTE — TELEPHONE ENCOUNTER
----- Message from Dariela sent at 11/15/2024  8:43 AM CST -----  Contact: [phone]  Requesting an RX refill or new RX.    Is this a refill or new RX:  Refill     RX name and strength rosuvastatin (CRESTOR) 5 MG tablet  Is this a 30 day or 90 day RX: 90    Pharmacy name and phone #       [id number] - [place], [place] - [address]  [address]  [facility] [address]  Phone: [phone] Fax: [phone]    Patient scheduled her Annual on 04/02/2024 no slot available until 04/02/2024.

## 2024-11-15 NOTE — TELEPHONE ENCOUNTER
No care due was identified.  [facility] Embedded Care Due Messages. Reference number: [id number].   11/15/2024 8:52:57 AM CST

## 2025-03-19 ENCOUNTER — TELEPHONE (OUTPATIENT)
Dept: PRIMARY CARE CLINIC | Facility: CLINIC | Age: 60
End: 2025-03-19
Payer: OTHER GOVERNMENT

## 2025-03-19 DIAGNOSIS — E04.1 THYROID NODULE: ICD-10-CM

## 2025-03-19 DIAGNOSIS — Z00.00 ANNUAL PHYSICAL EXAM: Primary | ICD-10-CM

## 2025-03-19 NOTE — TELEPHONE ENCOUNTER
----- Message from Riri sent at 3/19/2025  1:00 PM CDT -----  Contact: PT  [phone]  Would like to receive medical advice.Would they like a call back or a response via [facility]:  call backAdditional information:  Maritza is calling to ask if the provider is wanting her to do labs before her yearly visit, if so Maritza states she would like the lab orders to be sent to [facility]. Maritza states she would like to start going to [facility] again. Please call Maritza back for advice

## 2025-03-25 ENCOUNTER — RESULTS FOLLOW-UP (OUTPATIENT)
Dept: PRIMARY CARE CLINIC | Facility: CLINIC | Age: 60
End: 2025-03-25

## 2025-04-02 ENCOUNTER — OFFICE VISIT (OUTPATIENT)
Dept: PRIMARY CARE CLINIC | Facility: CLINIC | Age: 60
End: 2025-04-02
Payer: OTHER GOVERNMENT

## 2025-04-02 VITALS
SYSTOLIC BLOOD PRESSURE: 112 MMHG | OXYGEN SATURATION: 99 % | HEART RATE: 86 BPM | WEIGHT: 147.94 LBS | HEIGHT: 67 IN | BODY MASS INDEX: 23.22 KG/M2 | DIASTOLIC BLOOD PRESSURE: 88 MMHG

## 2025-04-02 DIAGNOSIS — M85.80 OSTEOPENIA, UNSPECIFIED LOCATION: ICD-10-CM

## 2025-04-02 DIAGNOSIS — E04.1 THYROID NODULE: ICD-10-CM

## 2025-04-02 DIAGNOSIS — E78.2 HYPERLIPIDEMIA, MIXED: ICD-10-CM

## 2025-04-02 DIAGNOSIS — Z00.00 ANNUAL PHYSICAL EXAM: Primary | ICD-10-CM

## 2025-04-02 PROCEDURE — 99396 PREV VISIT EST AGE 40-64: CPT | Mod: S$GLB,,, | Performed by: INTERNAL MEDICINE

## 2025-04-02 PROCEDURE — 99999 PR PBB SHADOW E&M-EST. PATIENT-LVL IV: CPT | Mod: PBBFAC,,, | Performed by: INTERNAL MEDICINE

## 2025-04-02 NOTE — PROGRESS NOTES
[facility] Note    Chief Complaint      Chief Complaint   Patient presents with    Annual Exam       History of Present Illness      History of Present Illness    CHIEF COMPLAINT:  Ms. Sesay presents today for follow up    THYROID:  She has been monitoring a thyroid nodule for 22 years with no growth noted. Thyroid function tests are within normal range. She denies difficulty swallowing or sensation of mass in neck.    LABS:  Total cholesterol improved to 172 (non-fasting). LDL cholesterol improved from pre-treatment level of 204 to current non-fasting level of 93 with rosuvastatin. Kidney and liver function tests were normal. Blood counts were normal.    PREVENTIVE HEALTH:  She denies getting annual flu vaccines. Mammogram is due in April. Last colonoscopy was performed in 2018 by Dr. Crane with 5-year follow-up recommendation.    LIFESTYLE:  She drinks 70 ounces of water daily and performs weight-bearing exercises.      ROS:  General: +fatigue  Cardiovascular: -lower extremity edema       Thyroid nodule: Previously saw Dr. Anna.  Surveillance imaging has been showing stability. Repeat deferred.  Thyroid labs normal.  HLD: LDL 93. Controlled on crestor  Osteopenia: LIght weight bearing exercise. Dr. Ardon to order repeats when due.   Diet: Prepares own food mostly.  Limiting fatty foods and carbs.  Drinks plenty water.  Exercise: Mostly yardwork.   Denies drinking and driving, drinking more than 4 drinks on occasion, drug use.     Flu shot declines.  Tdap unsure, due.  COVID vaccine UTD.  Shingrix discussed.  Pneumonia vaccine due age 63.   Mammogram UTD. Pap smear UTD.  Sees Dr. Ardon. DEXA 2022-23, osteopenia.  Colonoscopy Dr. Tadeo, 2018, polyp, 5 yr interval. Repeat due.       Assessment/Plan     Maritza Sesay is a 59 y.o.female with:    Assessment & Plan    Reviewed thyroid function and labs, noting results still in range.  Assessed cholesterol levels, noting  significant improvement with rosuvastatin (LDL decreased from 204 to 73 pre-treatment, now 93 non-fasting).  Renal function, liver function, and blood counts normal.  Evaluated need for colonoscopy, acknowledging concerns about cost and communication issues.  Reviewed bone density concerns, noting lifestyle measures including weight-bearing exercise and calcium intake.    THYROID NODULE:  - Discussed potential symptoms of growing thyroid nodule, including difficulty swallowing and feeling of something in neck.  - Ordered thyroid ultrasound this year to evaluate nodule size.  - Noted that the patient has been monitoring thyroid nodule for 22 years with no growth.  - Confirmed thyroid function tests and labs are within normal range.  - Advised the patient to continue monitoring thyroid function through annual labs.  - Discussed the option of ultrasound based on patient preference.  - Scheduled follow-up for annual thyroid function check via routine labs.    HYPERLIPIDEMIA:  - Continued rosuvastatin at current dose for cholesterol management.  - Noted that non-fasting total cholesterol is 172 with a good ratio.  - Observed that LDL cholesterol has decreased from 204 to 93 (non-fasting).  - Evaluated cholesterol levels as improved and satisfactory.  - Assessed patient's attention to cholesterol in diet.  - Scheduled follow-up for annual cholesterol level check.    GENERAL HEALTH:  - Ms. Sesay to maintain current water intake of approximately 70 oz per day.         1. Annual physical exam    2. Hyperlipidemia, mixed    3. Thyroid nodule    4. Osteopenia, unspecified location      Chronic conditions status updated as per HPI.  Other than changes above, cont current medications and maintain follow up with specialists.  No follow-ups on file.    No future appointments.          Past Medical History:  History reviewed. No pertinent past medical history.    Past Surgical History:   has a past surgical history that includes  "Cataract extraction, bilateral (Bilateral).    Family History:  family history includes Heart attack in her father; Hypertension in her mother; Pacemaker/defibrilator in her mother.     Social History:  Social History[1]    Medications:  Encounter Medications[2]    Allergies:  Review of patient's allergies indicates:  No Known Allergies    Health Maintenance:  Immunization History   Administered Date(s) Administered    COVID-19, MRNA, LN-S, PF (Pfizer) (Purple Cap) 06/24/2021, 07/16/2021      Health Maintenance   Topic Date Due    Hepatitis C Screening  Never done    HIV Screening  Never done    TETANUS VACCINE  Never done    Pneumococcal Vaccines (Age 50+) (1 of 2 - PCV) Never done    Shingles Vaccine (1 of 2) Never done    Colorectal Cancer Screening  12/14/2023    Influenza Vaccine (1) Never done    COVID-19 Vaccine (3 - 2024-25 season) 09/01/2024    Mammogram  04/05/2025    Cervical Cancer Screening  03/10/2030    Lipid Panel  03/24/2030    RSV Vaccine (Age 60+ and Pregnant patients) (1 - 1-dose 75+ series) 07/23/2038        Physical Exam      Vital Signs  Pulse: 86  SpO2: 99 %  BP: 112/88  BP Location: Left arm  Patient Position: Sitting  Pain Score: 0-No pain  Height and Weight  Height: 5' 7" (170.2 cm)  Weight: 67.1 kg (147 lb 14.9 oz)  BSA (Calculated - sq m): 1.78 sq meters  BMI (Calculated): 23.2  Weight in (lb) to have BMI = 25: 159.3]    Physical Exam    General: No acute distress. Well-developed. Well-nourished.  Eyes: EOMI. Sclerae anicteric.  HENT: Normocephalic. Atraumatic. Nares patent. Moist oral mucosa.  Ears: Bilateral TMs clear. Bilateral EACs clear.  Cardiovascular: Regular rate. Regular rhythm. No murmurs. No rubs. No gallops. Normal S1, S2.  Respiratory: Normal respiratory effort. Clear to auscultation bilaterally. No rales. No rhonchi. No wheezing.  Abdomen: Soft. Non-tender. Non-distended. Normoactive bowel sounds.  Musculoskeletal: No  obvious deformity.  Extremities: No lower extremity " edema.  Neurological: Alert & oriented x3. No slurred speech. Normal gait.  Psychiatric: Normal mood. Normal affect. Good insight. Good judgment.  Skin: Warm. Dry. No rash.         Physical Exam  Vitals reviewed.   Constitutional:       Appearance: She is well-developed.   HENT:      Head: Normocephalic and atraumatic.      Right Ear: External ear normal.      Left Ear: External ear normal.   Cardiovascular:      Rate and Rhythm: Normal rate and regular rhythm.      Heart sounds: Normal heart sounds. No murmur heard.  Pulmonary:      Effort: Pulmonary effort is normal.      Breath sounds: Normal breath sounds. No wheezing or rales.   Abdominal:      General: Bowel sounds are normal. There is no distension.      Palpations: Abdomen is soft.      Tenderness: There is no abdominal tenderness.         Laboratory:    Results              CBC:  Recent Labs   Lab 10/10/22  0732 10/30/23  0907 03/24/25  1021   WBC 6.3 6.4 6.5   RBC 4.07 L 4.12 4.02   Hemoglobin 13.2 13.0 13.2   Hematocrit 38.1 39.0 38.8   Platelets 194 170 156   MCV 93.6 94.8 96.5   MCH 32.4 31.6 32.8   MCHC 34.6 33.4 34.0     CMP:  Recent Labs   Lab 10/30/23  0907 10/30/23  0907 02/22/24 0719 03/24/25  1021   Glucose 94  --  87 76   Calcium 8.9  --  9.7 9.5   Albumin  --    < > 3.8 4.1   ALBUMIN 4.0  --   --   --    Total Protein 6.5   < > 6.6 6.2   Sodium 139  --  140 139   Potassium 4.2  --  4.3 4.6   CO2 27  --  26 25   Chloride 105  --  109 105   BUN 11.0   < > 11 13   Alkaline Phosphatase 78  --  93  --    ALT 11  --  13 29   AST 14  --  16 28   Total Bilirubin 0.6  --  1.0 0.5    < > = values in this interval not displayed.     URINALYSIS:       LIPIDS:  Recent Labs   Lab 10/10/22  0732 10/30/23  0907 02/22/24 0719 03/24/25  1021   TSH 2.67 2.32  --  2.25   HDL 51 54 47 56   Cholesterol 247 H 276 H 134 172   Triglycerides 106 90 67 124   LDL Cholesterol  --   --  73.6 93   LDL Calculated 179 H 204 H  --   --    HDL/Cholesterol Ratio  --   --  35.1  3.1   Non-HDL Cholesterol  --   --  87  --    Non HDL Chol. (LDL+VLDL)  --   --   --  116   Total Cholesterol/HDL Ratio  --   --  2.9  --      TSH:  Recent Labs   Lab 10/10/22  0732 10/30/23  0907 03/24/25  1021   TSH 2.67 2.32 2.25     A1C:            This note was generated with the assistance of ambient listening technology. Verbal consent was obtained by the patient and accompanying visitor(s) for the recording of patient appointment to facilitate this note. I attest to having reviewed and edited the generated note for accuracy, though some syntax or spelling errors may persist. Please contact the author of this note for any clarification.      Lizandro Rutledge MD  [facility]                       [1]   Social History  Tobacco Use    Smoking status: Light Smoker    Smokeless tobacco: Never   Substance Use Topics    Alcohol use: Yes   [2]   Outpatient Encounter Medications as of 4/2/2025   Medication Sig Dispense Refill    rosuvastatin (CRESTOR) 5 MG tablet Take 1 tablet (5 mg total) by mouth once daily. 90 tablet 3     No facility-administered encounter medications on file as of 4/2/2025.